# Patient Record
Sex: MALE | Race: WHITE | Employment: FULL TIME | URBAN - METROPOLITAN AREA
[De-identification: names, ages, dates, MRNs, and addresses within clinical notes are randomized per-mention and may not be internally consistent; named-entity substitution may affect disease eponyms.]

---

## 2022-07-11 ENCOUNTER — OFFICE VISIT (OUTPATIENT)
Dept: FAMILY MEDICINE CLINIC | Age: 57
End: 2022-07-11
Payer: COMMERCIAL

## 2022-07-11 VITALS
WEIGHT: 170 LBS | DIASTOLIC BLOOD PRESSURE: 93 MMHG | OXYGEN SATURATION: 94 % | BODY MASS INDEX: 24.34 KG/M2 | SYSTOLIC BLOOD PRESSURE: 164 MMHG | HEART RATE: 88 BPM | RESPIRATION RATE: 17 BRPM | HEIGHT: 70 IN | TEMPERATURE: 97.6 F

## 2022-07-11 DIAGNOSIS — M70.21 OLECRANON BURSITIS OF RIGHT ELBOW: ICD-10-CM
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
## 2022-07-11 DIAGNOSIS — M25.521 RIGHT ELBOW PAIN: Primary | ICD-10-CM

## 2022-07-11 DIAGNOSIS — M25.421 ELBOW SWELLING, RIGHT: ICD-10-CM

## 2022-07-11 PROCEDURE — 4004F PT TOBACCO SCREEN RCVD TLK: CPT | Performed by: NURSE PRACTITIONER

## 2022-07-11 PROCEDURE — G8420 CALC BMI NORM PARAMETERS: HCPCS | Performed by: NURSE PRACTITIONER

## 2022-07-11 PROCEDURE — 99202 OFFICE O/P NEW SF 15 MIN: CPT | Performed by: NURSE PRACTITIONER

## 2022-07-11 PROCEDURE — 3017F COLORECTAL CA SCREEN DOC REV: CPT | Performed by: NURSE PRACTITIONER

## 2022-07-11 PROCEDURE — G8427 DOCREV CUR MEDS BY ELIG CLIN: HCPCS | Performed by: NURSE PRACTITIONER

## 2022-07-11 RX ORDER — PREDNISONE 20 MG/1
20 TABLET ORAL 2 TIMES DAILY
Qty: 10 TABLET | Refills: 0 | Status: SHIPPED | OUTPATIENT
Start: 2022-07-11 | End: 2022-07-16

## 2022-07-11 SDOH — ECONOMIC STABILITY: FOOD INSECURITY: WITHIN THE PAST 12 MONTHS, THE FOOD YOU BOUGHT JUST DIDN'T LAST AND YOU DIDN'T HAVE MONEY TO GET MORE.: NEVER TRUE

## 2022-07-11 SDOH — ECONOMIC STABILITY: FOOD INSECURITY: WITHIN THE PAST 12 MONTHS, YOU WORRIED THAT YOUR FOOD WOULD RUN OUT BEFORE YOU GOT MONEY TO BUY MORE.: NEVER TRUE

## 2022-07-11 ASSESSMENT — PATIENT HEALTH QUESTIONNAIRE - PHQ9
SUM OF ALL RESPONSES TO PHQ QUESTIONS 1-9: 0
SUM OF ALL RESPONSES TO PHQ9 QUESTIONS 1 & 2: 0
1. LITTLE INTEREST OR PLEASURE IN DOING THINGS: 0
SUM OF ALL RESPONSES TO PHQ QUESTIONS 1-9: 0
DEPRESSION UNABLE TO ASSESS: FUNCTIONAL CAPACITY MOTIVATION LIMITS ACCURACY
SUM OF ALL RESPONSES TO PHQ QUESTIONS 1-9: 0
SUM OF ALL RESPONSES TO PHQ QUESTIONS 1-9: 0
2. FEELING DOWN, DEPRESSED OR HOPELESS: 0

## 2022-07-11 ASSESSMENT — SOCIAL DETERMINANTS OF HEALTH (SDOH): HOW HARD IS IT FOR YOU TO PAY FOR THE VERY BASICS LIKE FOOD, HOUSING, MEDICAL CARE, AND HEATING?: NOT HARD AT ALL

## 2022-07-11 NOTE — PROGRESS NOTES
development consistent with age, NAD. CV: Heart RRR, no murmurs, rubs, or gallops. Lungs: CTAB without W/R/R. Elbow Tenderness: Moderate            Swelling: Moderate             Deformity: Olecranon deformity noted. ROM: Decreased ROM due to pain. Skin:  No erythema, rashes, or abrasions noted. Stages of healing, ecchymosis noted along forearm & hand. Neurovascular: Motor deficit: /UE strength 5/5 bilaterally. No increased pain with supination or pronation of the hand. Sensory deficit:   Sensation intact proximally and distally to the injury site. Pulse deficit: 2+ and bounding. Capillary refill: Less then 2 sec throughout. Neurological:  Alert and oriented. Motor functions intact. Lab / Imaging Results   (All laboratory and radiology results have been personally reviewed by myself)  Labs:  No results found for this visit on 07/11/22. Imaging: All Radiology results interpreted by Radiologist unless otherwise noted. Narrative   EXAMINATION:   TWO XRAY VIEWS OF THE RIGHT ELBOW       7/11/2022 3:01 pm       COMPARISON:   None.       HISTORY:   ORDERING SYSTEM PROVIDED HISTORY: Right elbow pain   TECHNOLOGIST PROVIDED HISTORY:   Reason for exam:->see above       FINDINGS:   Two views right elbow demonstrate soft tissue swelling which is a slight be   proximal to the olecranon process along the dorsal aspect of the medial   radial head.  There is no evidence of acute fracture or subluxation. Freeda Kelly   is no evidence of a joint space effusion.  There is no evidence of radiopaque   foreign body.           Impression   Marked soft tissue swelling primarily over the radial aspect of the right   elbow joint space which extends dorsally.  These findings are consistent with   severe bursitis       There is no fracture or subluxation and no evidence of radiopaque foreign   body.      Assessment / Plan     Impression(s):  Cristin Dress was seen today for elbow injury. Diagnoses and all orders for this visit:    Right elbow pain  -     XR ELBOW RIGHT (2 VIEWS); Future  -     predniSONE (DELTASONE) 20 MG tablet; Take 1 tablet by mouth 2 times daily for 5 days  -     G[address], DO, [facility], [id number] [place] with OTC Tylenol or Motrin as pain reliever    Elbow swelling, right  -     XR ELBOW RIGHT (2 VIEWS); Future  -     predniSONE (DELTASONE) 20 MG tablet; Take 1 tablet by mouth 2 times daily for 5 days    Injury due to fall, initial encounter  -     XR ELBOW RIGHT (2 VIEWS); Future  -     G[address], DO, [facility], [place]    Olecranon bursitis of right elbow  -     G[address] U[address]a [address], DO, [facility], [place]  - Support sleeve suggestive, pt is agreeable to obtain at pharmacy today    Order given, will call with results once available. Script written, side effects discussed. RICE protocol advised. Instructions for additional f/u TBD based on x-ray results. ED sooner if symptoms worsen or change. ED immediately with any severe/worsening pain, paresthesias, weakness, fever, nausea, or vomiting. Pt states understanding and is in agreement with this care plan. All questions answered. Shala Dunham, APRN - CNP    **This report was transcribed using voice recognition software. Every effort was made to ensure accuracy; however, inadvertent computerized transcription errors may be present.